# Patient Record
Sex: FEMALE | Race: WHITE | NOT HISPANIC OR LATINO
[De-identification: names, ages, dates, MRNs, and addresses within clinical notes are randomized per-mention and may not be internally consistent; named-entity substitution may affect disease eponyms.]

---

## 2019-08-01 ENCOUNTER — RESULT REVIEW (OUTPATIENT)
Age: 29
End: 2019-08-01

## 2020-08-11 ENCOUNTER — LABORATORY RESULT (OUTPATIENT)
Age: 30
End: 2020-08-11

## 2020-08-11 ENCOUNTER — APPOINTMENT (OUTPATIENT)
Dept: OBGYN | Facility: CLINIC | Age: 30
End: 2020-08-11
Payer: COMMERCIAL

## 2020-08-11 VITALS
HEIGHT: 63 IN | SYSTOLIC BLOOD PRESSURE: 104 MMHG | RESPIRATION RATE: 16 BRPM | BODY MASS INDEX: 21.26 KG/M2 | DIASTOLIC BLOOD PRESSURE: 64 MMHG | WEIGHT: 120 LBS

## 2020-08-11 DIAGNOSIS — Z32.02 ENCOUNTER FOR PREGNANCY TEST, RESULT NEGATIVE: ICD-10-CM

## 2020-08-11 DIAGNOSIS — R39.9 UNSPECIFIED SYMPTOMS AND SIGNS INVOLVING THE GENITOURINARY SYSTEM: ICD-10-CM

## 2020-08-11 LAB
BILIRUB UR QL STRIP: NORMAL
CLARITY UR: CLEAR
COLLECTION METHOD: NORMAL
GLUCOSE UR-MCNC: NORMAL
HCG UR QL: NEGATIVE
HGB UR QL STRIP.AUTO: NORMAL
KETONES UR-MCNC: NORMAL
LEUKOCYTE ESTERASE UR QL STRIP: NORMAL
NITRITE UR QL STRIP: NORMAL
PROT UR STRIP-MCNC: NORMAL
QUALITY CONTROL: YES

## 2020-08-11 PROCEDURE — 99202 OFFICE O/P NEW SF 15 MIN: CPT

## 2020-08-11 PROCEDURE — 81003 URINALYSIS AUTO W/O SCOPE: CPT | Mod: QW

## 2020-08-11 PROCEDURE — 81025 URINE PREGNANCY TEST: CPT

## 2020-08-11 RX ORDER — NITROFURANTOIN (MONOHYDRATE/MACROCRYSTALS) 25; 75 MG/1; MG/1
100 CAPSULE ORAL
Qty: 10 | Refills: 0 | Status: ACTIVE | COMMUNITY
Start: 2020-08-11 | End: 1900-01-01

## 2020-08-11 RX ORDER — FLUCONAZOLE 150 MG/1
150 TABLET ORAL
Qty: 1 | Refills: 0 | Status: ACTIVE | COMMUNITY
Start: 2020-08-11 | End: 1900-01-01

## 2020-08-11 NOTE — COUNSELING
[Nutrition] : nutrition [Exercise] : exercise [Vitamins/Supplements] : vitamins/supplements [Medication Management] : medication management [Bladder Hygiene] : bladder hygiene [Vulvar Hygiene] : vulvar hygiene

## 2020-08-12 LAB
APPEARANCE: CLEAR
BILIRUBIN URINE: NEGATIVE
BLOOD URINE: NEGATIVE
COLOR: NORMAL
GLUCOSE QUALITATIVE U: NEGATIVE
KETONES URINE: NEGATIVE
LEUKOCYTE ESTERASE URINE: NEGATIVE
NITRITE URINE: NEGATIVE
PH URINE: 6
PROTEIN URINE: NORMAL
SPECIFIC GRAVITY URINE: 1.03
UROBILINOGEN URINE: NORMAL

## 2020-12-28 ENCOUNTER — RESULT REVIEW (OUTPATIENT)
Age: 30
End: 2020-12-28

## 2022-01-07 ENCOUNTER — INPATIENT (INPATIENT)
Facility: HOSPITAL | Age: 32
LOS: 1 days | Discharge: HOME | End: 2022-01-09
Attending: OBSTETRICS & GYNECOLOGY | Admitting: OBSTETRICS & GYNECOLOGY
Payer: COMMERCIAL

## 2022-01-07 VITALS
RESPIRATION RATE: 16 BRPM | HEART RATE: 124 BPM | SYSTOLIC BLOOD PRESSURE: 104 MMHG | WEIGHT: 151.9 LBS | TEMPERATURE: 98 F | DIASTOLIC BLOOD PRESSURE: 78 MMHG | HEIGHT: 63 IN

## 2022-01-07 LAB
BASOPHILS # BLD AUTO: 0.07 K/UL — SIGNIFICANT CHANGE UP (ref 0–0.2)
BASOPHILS NFR BLD AUTO: 0.8 % — SIGNIFICANT CHANGE UP (ref 0–2)
BLD GP AB SCN SERPL QL: SIGNIFICANT CHANGE UP
COVID-19 SPIKE DOMAIN AB INTERP: POSITIVE
COVID-19 SPIKE DOMAIN ANTIBODY RESULT: >250 U/ML — HIGH
EOSINOPHIL # BLD AUTO: 0.12 K/UL — SIGNIFICANT CHANGE UP (ref 0–0.5)
EOSINOPHIL NFR BLD AUTO: 1.3 % — SIGNIFICANT CHANGE UP (ref 0–6)
GLUCOSE BLDC GLUCOMTR-MCNC: 83 MG/DL — SIGNIFICANT CHANGE UP (ref 70–99)
HCT VFR BLD CALC: 33.9 % — LOW (ref 34.5–45)
HGB BLD-MCNC: 11.4 G/DL — LOW (ref 11.5–15.5)
HIV 1 & 2 AB SERPL IA.RAPID: SIGNIFICANT CHANGE UP
IMM GRANULOCYTES NFR BLD AUTO: 0.3 % — SIGNIFICANT CHANGE UP (ref 0–1.5)
LYMPHOCYTES # BLD AUTO: 1.9 K/UL — SIGNIFICANT CHANGE UP (ref 1–3.3)
LYMPHOCYTES # BLD AUTO: 21.4 % — SIGNIFICANT CHANGE UP (ref 13–44)
MCHC RBC-ENTMCNC: 26.8 PG — LOW (ref 27–34)
MCHC RBC-ENTMCNC: 33.6 GM/DL — SIGNIFICANT CHANGE UP (ref 32–36)
MCV RBC AUTO: 79.8 FL — LOW (ref 80–100)
MONOCYTES # BLD AUTO: 0.56 K/UL — SIGNIFICANT CHANGE UP (ref 0–0.9)
MONOCYTES NFR BLD AUTO: 6.3 % — SIGNIFICANT CHANGE UP (ref 2–14)
NEUTROPHILS # BLD AUTO: 6.21 K/UL — SIGNIFICANT CHANGE UP (ref 1.8–7.4)
NEUTROPHILS NFR BLD AUTO: 69.9 % — SIGNIFICANT CHANGE UP (ref 43–77)
PLATELET # BLD AUTO: 218 K/UL — SIGNIFICANT CHANGE UP (ref 150–400)
RAPID RVP RESULT: SIGNIFICANT CHANGE UP
RBC # BLD: 4.25 M/UL — SIGNIFICANT CHANGE UP (ref 3.8–5.2)
RBC # FLD: 14.1 % — SIGNIFICANT CHANGE UP (ref 10.3–14.5)
SARS-COV-2 IGG+IGM SERPL QL IA: >250 U/ML — HIGH
SARS-COV-2 IGG+IGM SERPL QL IA: POSITIVE
SARS-COV-2 RNA SPEC QL NAA+PROBE: SIGNIFICANT CHANGE UP
WBC # BLD: 8.89 K/UL — SIGNIFICANT CHANGE UP (ref 3.8–10.5)
WBC # FLD AUTO: 8.89 K/UL — SIGNIFICANT CHANGE UP (ref 3.8–10.5)

## 2022-01-07 RX ORDER — OXYTOCIN 10 UNIT/ML
333.33 VIAL (ML) INJECTION
Qty: 20 | Refills: 0 | Status: DISCONTINUED | OUTPATIENT
Start: 2022-01-07 | End: 2022-01-09

## 2022-01-07 RX ORDER — CITRIC ACID/SODIUM CITRATE 300-500 MG
30 SOLUTION, ORAL ORAL ONCE
Refills: 0 | Status: DISCONTINUED | OUTPATIENT
Start: 2022-01-07 | End: 2022-01-08

## 2022-01-07 RX ORDER — SODIUM CHLORIDE 9 MG/ML
1000 INJECTION, SOLUTION INTRAVENOUS
Refills: 0 | Status: DISCONTINUED | OUTPATIENT
Start: 2022-01-07 | End: 2022-01-08

## 2022-01-07 NOTE — OB PROVIDER H&P - ASSESSMENT
A/P:    - Admit to L&D  - Admission labs  - Consent  - NPO  - IV fluid  - GBS unknown    d/w Dr. Robertson   A/P: 31y G1 at 40w5d GA by LMP who presents to L&D for IOL at term    - Admit to L&D  - Admission labs  - Consent  - NPO  - IV fluid  - GBS unknown    d/w Dr. Robertson

## 2022-01-07 NOTE — OB PROVIDER H&P - HISTORY OF PRESENT ILLNESS
31y G_P_ at _ weeks GA by LMP consistent with _ trimester sono who presents to L&D for _. Patient denies vaginal bleeding, contractions and leakage of fluid. She endorses good fetal movement. Denies fevers, chills, nausea, vomiting, chest pain, SOB, dizziness and headache. No other complaints at this time.   KATY: _  LMP: _  Prenatal course is significant for:  Prenatal course uncomplicated.      POB:  PGYN: -fibroids, -ovarian cysts, denies STD hx, denies abnormal PAPs   PMH: Denies  PSH: Denies  SH: Denies EtOH, tobacco and illicit drug use during this pregnancy; feels safe at home   Meds: PNVs  Allergies: NKDA    BMI:  Sono:  EFW:      31y G1 at 40w5d GA by LMP who presents to L&D for IOL at term. Patient reports contractions for 1 week worsening today. Denies Vaginal bleeding, leakage of fluid. She endorses good fetal movement. Denies fevers, chills, nausea, vomiting, chest pain, SOB, dizziness and headache. No other complaints at this time.   KATY: 1/2/22    Prenatal course is significant for:  Glucose intolerance     POB: G1  PGYN: -fibroids, -ovarian cysts, denies STD hx, denies abnormal PAPs   PMH: Denies  PSH: Denies  SH: Denies EtOH, tobacco and illicit drug use during this pregnancy; feels safe at home   Meds: PNVs  Allergies: NKDA

## 2022-01-07 NOTE — OB PROVIDER H&P - ATTENDING COMMENTS
31y G1 at 40w5d GA by LMP who presents to L&D for IOL at term.  VSS  plan for IOL with cytotec.     Gosia Robertson MD

## 2022-01-07 NOTE — OB RN PATIENT PROFILE - NS_PARA_OBGYN_ALL_OB_NU
Calcium level remains high despite sensipar 30mg daily  Increase to 60mg daily  Keep well hydrated  Repeat lab testing in 1 month  She is now open to considering surgery  Ordered 24-hour urine calcium testing and sestamibi scan  After review of results and completion of FNA of thyroid nodules will refer to surgeon for evaluation  Orders/instructions were reviewed with patient in detail  She was given collection supplies/instructions for 24-hour urine 
Control is stable     Lab Results   Component Value Date    HGBA1C 7 1 (H) 08/10/2021
Not at goal today, recently controlled at PCP office  Hyperparathyroidism may be contributing to the high blood pressure 
Previous FNA was non-diagnostic and experience was traumatic for patient  At last visit in April we discussed options with her son including monitoring with ultrasound or repeat FNA with afirma testing  At the visit, she and her son decided on repeat FNA with Afirma under sedation  She still reports that she wants the FNA as long as she does not have to be awake  She was given another order at the time of visit and she was advised to call to schedule procedure  I called her son (POA) to discuss her care and need for testing and recommend that he come to all medical visits if possible 
0

## 2022-01-07 NOTE — OB RN PATIENT PROFILE - NSICDXFAMILYHX_GEN_ALL_CORE_FT
FAMILY HISTORY:  Father  Still living? Yes, Estimated age: Age Unknown  FH: colon cancer, Age at diagnosis: Age Unknown

## 2022-01-07 NOTE — OB PROVIDER H&P - NSHPPHYSICALEXAM_GEN_ALL_CORE
T(C): 36.6 (01-07-22 @ 10:43), Max: 36.6 (01-07-22 @ 10:43)  HR: 124 (01-07-22 @ 10:43) (124 - 124)  BP: 104/78 (01-07-22 @ 10:43) (104/78 - 104/78)  RR: 16 (01-07-22 @ 10:43) (16 - 16)  SpO2: --    Gen: NAD, well-appearing, AAOx3   Abd: Soft, gravid  Ext: non-tender, non-edematous  SSE:   SVE:    Bedside sono:  FHT:  [place]: T(C): 36.6 (01-07-22 @ 10:43), Max: 36.6 (01-07-22 @ 10:43)  HR: 124 (01-07-22 @ 10:43) (124 - 124)  BP: 104/78 (01-07-22 @ 10:43) (104/78 - 104/78)  RR: 16 (01-07-22 @ 10:43) (16 - 16)  SpO2: --    Gen: NAD, well-appearing, AAOx3   Abd: Soft, gravid  Ext: non-tender, non-edematous  SVE:  1/50/-2  Bedside sono: vertex, posterior placenta  FHT: 130bpm, moderate variability, +acels, no decels  [place]: irregular contractions

## 2022-01-08 ENCOUNTER — TRANSCRIPTION ENCOUNTER (OUTPATIENT)
Age: 32
End: 2022-01-08

## 2022-01-08 LAB
HIV 1+2 AB+HIV1 P24 AG SERPL QL IA: SIGNIFICANT CHANGE UP
T PALLIDUM AB TITR SER: NEGATIVE — SIGNIFICANT CHANGE UP

## 2022-01-08 RX ORDER — OXYTOCIN 10 UNIT/ML
VIAL (ML) INJECTION
Qty: 30 | Refills: 0 | Status: DISCONTINUED | OUTPATIENT
Start: 2022-01-08 | End: 2022-01-08

## 2022-01-08 RX ORDER — SIMETHICONE 80 MG/1
80 TABLET, CHEWABLE ORAL EVERY 4 HOURS
Refills: 0 | Status: DISCONTINUED | OUTPATIENT
Start: 2022-01-08 | End: 2022-01-09

## 2022-01-08 RX ORDER — MAGNESIUM HYDROXIDE 400 MG/1
30 TABLET, CHEWABLE ORAL
Refills: 0 | Status: DISCONTINUED | OUTPATIENT
Start: 2022-01-08 | End: 2022-01-09

## 2022-01-08 RX ORDER — TETANUS TOXOID, REDUCED DIPHTHERIA TOXOID AND ACELLULAR PERTUSSIS VACCINE, ADSORBED 5; 2.5; 8; 8; 2.5 [IU]/.5ML; [IU]/.5ML; UG/.5ML; UG/.5ML; UG/.5ML
0.5 SUSPENSION INTRAMUSCULAR ONCE
Refills: 0 | Status: DISCONTINUED | OUTPATIENT
Start: 2022-01-08 | End: 2022-01-09

## 2022-01-08 RX ORDER — OXYCODONE HYDROCHLORIDE 5 MG/1
5 TABLET ORAL ONCE
Refills: 0 | Status: DISCONTINUED | OUTPATIENT
Start: 2022-01-08 | End: 2022-01-09

## 2022-01-08 RX ORDER — TETANUS TOXOID, REDUCED DIPHTHERIA TOXOID AND ACELLULAR PERTUSSIS VACCINE, ADSORBED 5; 2.5; 8; 8; 2.5 [IU]/.5ML; [IU]/.5ML; UG/.5ML; UG/.5ML; UG/.5ML
0.5 SUSPENSION INTRAMUSCULAR ONCE
Refills: 0 | Status: COMPLETED | OUTPATIENT
Start: 2022-01-08

## 2022-01-08 RX ORDER — OXYTOCIN 10 UNIT/ML
333.33 VIAL (ML) INJECTION
Qty: 20 | Refills: 0 | Status: DISCONTINUED | OUTPATIENT
Start: 2022-01-08 | End: 2022-01-09

## 2022-01-08 RX ORDER — HYDROCORTISONE 1 %
1 OINTMENT (GRAM) TOPICAL EVERY 6 HOURS
Refills: 0 | Status: DISCONTINUED | OUTPATIENT
Start: 2022-01-08 | End: 2022-01-09

## 2022-01-08 RX ORDER — DIPHENHYDRAMINE HCL 50 MG
25 CAPSULE ORAL EVERY 6 HOURS
Refills: 0 | Status: DISCONTINUED | OUTPATIENT
Start: 2022-01-08 | End: 2022-01-09

## 2022-01-08 RX ORDER — SODIUM CHLORIDE 9 MG/ML
3 INJECTION INTRAMUSCULAR; INTRAVENOUS; SUBCUTANEOUS EVERY 8 HOURS
Refills: 0 | Status: DISCONTINUED | OUTPATIENT
Start: 2022-01-08 | End: 2022-01-09

## 2022-01-08 RX ORDER — OXYCODONE HYDROCHLORIDE 5 MG/1
5 TABLET ORAL
Refills: 0 | Status: DISCONTINUED | OUTPATIENT
Start: 2022-01-08 | End: 2022-01-09

## 2022-01-08 RX ORDER — BENZOCAINE 10 %
1 GEL (GRAM) MUCOUS MEMBRANE EVERY 6 HOURS
Refills: 0 | Status: DISCONTINUED | OUTPATIENT
Start: 2022-01-08 | End: 2022-01-09

## 2022-01-08 RX ORDER — PRAMOXINE HYDROCHLORIDE 150 MG/15G
1 AEROSOL, FOAM RECTAL EVERY 4 HOURS
Refills: 0 | Status: DISCONTINUED | OUTPATIENT
Start: 2022-01-08 | End: 2022-01-09

## 2022-01-08 RX ORDER — KETOROLAC TROMETHAMINE 30 MG/ML
30 SYRINGE (ML) INJECTION ONCE
Refills: 0 | Status: DISCONTINUED | OUTPATIENT
Start: 2022-01-08 | End: 2022-01-09

## 2022-01-08 RX ORDER — IBUPROFEN 200 MG
600 TABLET ORAL EVERY 6 HOURS
Refills: 0 | Status: DISCONTINUED | OUTPATIENT
Start: 2022-01-08 | End: 2022-01-09

## 2022-01-08 RX ORDER — IBUPROFEN 200 MG
600 TABLET ORAL EVERY 6 HOURS
Refills: 0 | Status: COMPLETED | OUTPATIENT
Start: 2022-01-08 | End: 2022-12-07

## 2022-01-08 RX ORDER — AER TRAVELER 0.5 G/1
1 SOLUTION RECTAL; TOPICAL EVERY 4 HOURS
Refills: 0 | Status: DISCONTINUED | OUTPATIENT
Start: 2022-01-08 | End: 2022-01-09

## 2022-01-08 RX ORDER — LANOLIN
1 OINTMENT (GRAM) TOPICAL EVERY 6 HOURS
Refills: 0 | Status: DISCONTINUED | OUTPATIENT
Start: 2022-01-08 | End: 2022-01-09

## 2022-01-08 RX ORDER — ACETAMINOPHEN 500 MG
975 TABLET ORAL
Refills: 0 | Status: DISCONTINUED | OUTPATIENT
Start: 2022-01-08 | End: 2022-01-09

## 2022-01-08 RX ORDER — DIBUCAINE 1 %
1 OINTMENT (GRAM) RECTAL EVERY 6 HOURS
Refills: 0 | Status: DISCONTINUED | OUTPATIENT
Start: 2022-01-08 | End: 2022-01-09

## 2022-01-08 RX ADMIN — Medication 1000 MILLIUNIT(S)/MIN: at 10:58

## 2022-01-08 RX ADMIN — SODIUM CHLORIDE 3 MILLILITER(S): 9 INJECTION INTRAMUSCULAR; INTRAVENOUS; SUBCUTANEOUS at 12:54

## 2022-01-08 RX ADMIN — Medication 975 MILLIGRAM(S): at 15:11

## 2022-01-08 RX ADMIN — Medication 1 TABLET(S): at 12:30

## 2022-01-08 RX ADMIN — Medication 975 MILLIGRAM(S): at 22:01

## 2022-01-08 RX ADMIN — Medication 2 MILLIUNIT(S)/MIN: at 03:48

## 2022-01-08 RX ADMIN — Medication 975 MILLIGRAM(S): at 15:52

## 2022-01-08 NOTE — OB PROVIDER LABOR PROGRESS NOTE - NS_OBIHIFHRDETAILS_OBGYN_ALL_OB_FT
155 bpm, moderate variability, +accels, -deccels
150 bpm, moderate variability, +accels, intermittent shallow variable deccels
145 bpm, moderate variability, +accels, intermittent variable deccels (s/p SROM)
baseline 140, mod variability, +accels, no decels
reactive

## 2022-01-08 NOTE — OB PROVIDER LABOR PROGRESS NOTE - NS_SUBJECTIVE/OBJECTIVE_OBGYN_ALL_OB_FT
Patient endorsing rectal pressure.
Patient comfortable s/p Epidural.
Patient comfortable.
late entry note (written at 1551 for actions taken 1330)  patient vaginal cytotec 25 placed

## 2022-01-08 NOTE — OB PROVIDER DELIVERY SUMMARY - NSSELHIDDEN_OBGYN_ALL_OB_FT
[NS_DeliveryAttending1_OBGYN_ALL_OB_FT:[id number]==],[NS_DeliveryAssist1_OBGYN_ALL_OB_FT:[id number]=],[NS_DeliveryRN_OBGYN_ALL_OB_FT:[id number]=]

## 2022-01-08 NOTE — OB PROVIDER LABOR PROGRESS NOTE - ASSESSMENT
Cat 1 tracing   - VSS  - Vaginal cytotec x3 placed   - continue to monitor    Discussed with Dr. Anguiano 
Cat 2 tracing   - VSS  - Patient repositioned   - SROMed at 2am, clear fluids   - will order pitocin  - Epidural in place   - continue to monitor    D/w Dr. Anguiano
Cat I tracing  irregular ctx  reeval for 2nd vaginal cyottec vs other induction method at 1730
vaginal cytotec placed
Cat 2 tracing   - Patient repositioned  - Pitocin currently at 2   - patient to labor down    D/w Dr. Anguiano

## 2022-01-08 NOTE — OB PROVIDER LABOR PROGRESS NOTE - NS_EFFACEMANT_OBGYN_ALL_OB_NU
If you have any questions regarding your visit, Please contact your care team.  [facility]: [phone]  [facility] CLINIC HOURS TELEPHONE NUMBER   Cephas Agbeh, M.D. Lisa -       Thelma Pardo         Monday-[place]    8:00a.m-4:45 p.m    Tuesday--[place]     8:00a.m-4:45 p.m.    Thursday-[place]    8:00a.m-4:45 p.m.    Friday-[place]    8:00a.m-4:45 p.m    [facility]   [address] [place] [address]   [phone]-Ask for [facility]   Fax [phone]   Priapgq-[phone]     [facility] Labor and Delivery   [address] [place] [address] [place] [address]   [phone]    [facility]  [address] [facility] [address]  [phone]  [place]-[phone]   Urgent Care locations:    [facility] 
100
50
50
70
90

## 2022-01-08 NOTE — OB PROVIDER DELIVERY SUMMARY - NSPROVIDERDELIVERYNOTE_OBGYN_ALL_OB_FT
Patient felt rectal pressure and was found to be fully dilated, +1 station.  Ley catheter removed and patient positioned for pushing.  She pushed effectively for 30 minutes.  Patient prepped and draped for delivery.  Median episiotomy and in conjunction with maternal effort, she delivered a viable female infant.  Head delivered LORNE   No nuchal cord.   shoulders and body then delivered without difficulty.  cord clamped x2 and cut. Cord blood also collected for blood type.  Placenta delivered spontaneously and intact.  No gross pathology, 3 vessel cord. Excellent hemostasis was achieved.  Perineum and vagina were inspected and the median episiotomy / 1st degree perineal lac was noted and repaired with 2-0 vicryl suture. Excellent hemostasis obtained, EBL 105cc, no complications.    : female, Apgars 8/9. 3255g

## 2022-01-08 NOTE — OB RN DELIVERY SUMMARY - NS_SEPSISRSKCALC_OBGYN_ALL_OB_FT
EOS calculated successfully. EOS Risk Factor: 0.5/1000 live births ([facility] national incidence); GA=40w6d; Temp=98.78; ROM=6.3; GBS='Negative'; Antibiotics='No antibiotics or any antibiotics < 2 hrs prior to birth'

## 2022-01-08 NOTE — OB RN DELIVERY SUMMARY - NSSELHIDDEN_OBGYN_ALL_OB_FT
[NS_DeliveryAttending1_OBGYN_ALL_OB_FT:[id number]],[NS_DeliveryAssist1_OBGYN_ALL_OB_FT:[id number]],[NS_DeliveryRN_OBGYN_ALL_OB_FT:[id number]=]

## 2022-01-09 VITALS
RESPIRATION RATE: 16 BRPM | TEMPERATURE: 98 F | SYSTOLIC BLOOD PRESSURE: 122 MMHG | HEART RATE: 87 BPM | DIASTOLIC BLOOD PRESSURE: 78 MMHG

## 2022-01-09 LAB
HCT VFR BLD CALC: 26.2 % — LOW (ref 34.5–45)
HGB BLD-MCNC: 8.8 G/DL — LOW (ref 11.5–15.5)

## 2022-01-09 PROCEDURE — 86769 SARS-COV-2 COVID-19 ANTIBODY: CPT

## 2022-01-09 PROCEDURE — 86850 RBC ANTIBODY SCREEN: CPT

## 2022-01-09 PROCEDURE — 86900 BLOOD TYPING SEROLOGIC ABO: CPT

## 2022-01-09 PROCEDURE — 0225U NFCT DS DNA&RNA 21 SARSCOV2: CPT

## 2022-01-09 PROCEDURE — 85018 HEMOGLOBIN: CPT

## 2022-01-09 PROCEDURE — 85014 HEMATOCRIT: CPT

## 2022-01-09 PROCEDURE — 59050 FETAL MONITOR W/REPORT: CPT

## 2022-01-09 PROCEDURE — 59025 FETAL NON-STRESS TEST: CPT

## 2022-01-09 PROCEDURE — 82962 GLUCOSE BLOOD TEST: CPT

## 2022-01-09 PROCEDURE — 36415 COLL VENOUS BLD VENIPUNCTURE: CPT

## 2022-01-09 PROCEDURE — 87389 HIV-1 AG W/HIV-1&-2 AB AG IA: CPT

## 2022-01-09 PROCEDURE — 85025 COMPLETE CBC W/AUTO DIFF WBC: CPT

## 2022-01-09 PROCEDURE — G0463: CPT

## 2022-01-09 PROCEDURE — 86780 TREPONEMA PALLIDUM: CPT

## 2022-01-09 PROCEDURE — 86765 RUBEOLA ANTIBODY: CPT

## 2022-01-09 PROCEDURE — 86901 BLOOD TYPING SEROLOGIC RH(D): CPT

## 2022-01-09 PROCEDURE — 90707 MMR VACCINE SC: CPT

## 2022-01-09 PROCEDURE — 86703 HIV-1/HIV-2 1 RESULT ANTBDY: CPT

## 2022-01-09 RX ORDER — FERROUS SULFATE 325(65) MG
325 TABLET ORAL DAILY
Refills: 0 | Status: DISCONTINUED | OUTPATIENT
Start: 2022-01-09 | End: 2022-01-09

## 2022-01-09 RX ORDER — FERROUS SULFATE 325(65) MG
1 TABLET ORAL
Qty: 30 | Refills: 0
Start: 2022-01-09 | End: 2022-02-07

## 2022-01-09 RX ADMIN — Medication 600 MILLIGRAM(S): at 14:45

## 2022-01-09 RX ADMIN — Medication 1 TABLET(S): at 14:45

## 2022-01-09 RX ADMIN — Medication 0.5 MILLILITER(S): at 14:46

## 2022-01-09 RX ADMIN — Medication 975 MILLIGRAM(S): at 03:42

## 2022-01-09 RX ADMIN — SODIUM CHLORIDE 3 MILLILITER(S): 9 INJECTION INTRAMUSCULAR; INTRAVENOUS; SUBCUTANEOUS at 14:41

## 2022-01-09 RX ADMIN — Medication 975 MILLIGRAM(S): at 10:34

## 2022-01-09 RX ADMIN — Medication 600 MILLIGRAM(S): at 15:40

## 2022-01-09 RX ADMIN — Medication 975 MILLIGRAM(S): at 09:25

## 2022-01-09 NOTE — PROGRESS NOTE ADULT - ATTENDING COMMENTS
Patient was seen and evaluated at bedside. She has no complaints today. She is ambulating, tolerating regular diet, and voiding freely. Vitals signs stable. Labs reviewed and within normal limits. Continue routine postpartum care 15-Feb-2021 20:19

## 2022-01-09 NOTE — DISCHARGE NOTE OB - PLAN OF CARE
1) Please take Ibuprofen and tylenol as needed for pain control  2) Nothing in the vagina for 6 weeks (including no sex, no tampons, and no douching).  3) Please call your doctor for a follow up your postpartum appointment in 6 weeks.  4) Please continue taking vitamins postpartum.   5) Please call the office sooner if you have heavy vaginal bleeding, severe abdominal pain, or fever > 100.4F.  6) You may resume regular activity as tolerated

## 2022-01-09 NOTE — DISCHARGE NOTE OB - PATIENT PORTAL LINK FT
You can access the FollowMyHealth Patient Portal offered by [facility] by registering at the following website: [email or web address]. By joining [employer]’s FollowMyHealth portal, you will also be able to view your health information using other applications (apps) compatible with our system.

## 2022-01-09 NOTE — PROGRESS NOTE ADULT - SUBJECTIVE AND OBJECTIVE BOX
KETAN [place]  [id number]  Postpartum Note Vaginal Delivery  Patient is a 30yo G1now P1 s/p FT  day 1.    Subjective:  No acute events overnight.   Patient is tolerating diet and denies N/V.   Patient still has slight vaginal bleeding that is decreasing in amount.   She is breastfeeding and the baby is latching on.    Urinating appropriately.   -BM/+flatus.    Physical exam:  Vital Signs Last 24 Hrs  T(C): 36.5 (2022 05:12), Max: 37.2 (2022 08:40)  T(F): 97.7 (2022 05:12), Max: 99 (2022 08:40)  HR: 87 (2022 05:12) (83 - 133)  BP: 122/78 (2022 05:12) (116/73 - 126/62)  RR: 16 (2022 05:12) (15 - 18)  SpO2: 98% (2022 22:10) (97% - 98%)    Heart: RRR  Lungs: CTABL  Breast: non tender, not engorged   Abdomen: Soft, nontender, no distension, firm uterine fundus below umbilicus  Ext: No DVT signs, warm extremities    LABS:                        8.8    x     )-----------( x        ( 2022 06:39 )             26.2       acetaminophen     Tablet .. 975 milliGRAM(s) Oral <User Schedule>  benzocaine 20%/menthol 0.5% Spray 1 Spray(s) Topical every 6 hours PRN  dibucaine 1% Ointment 1 Application(s) Topical every 6 hours PRN  diphenhydrAMINE 25 milliGRAM(s) Oral every 6 hours PRN  diphtheria/tetanus/pertussis (acellular) Vaccine (ADAcel) 0.5 milliLiter(s) IntraMuscular once  hydrocortisone 1% Cream 1 Application(s) Topical every 6 hours PRN  ibuprofen  Tablet. 600 milliGRAM(s) Oral every 6 hours  ketorolac   Injectable 30 milliGRAM(s) IV Push once  lanolin Ointment 1 Application(s) Topical every 6 hours PRN  magnesium hydroxide Suspension 30 milliLiter(s) Oral two times a day PRN  oxyCODONE    IR 5 milliGRAM(s) Oral every 3 hours PRN  oxyCODONE    IR 5 milliGRAM(s) Oral once PRN  oxytocin Infusion 333.333 milliUNIT(s)/Min IV Continuous <Continuous>  oxytocin Infusion 333.333 milliUNIT(s)/Min IV Continuous <Continuous>  pramoxine 1%/zinc 5% Cream 1 Application(s) Topical every 4 hours PRN  prenatal multivitamin 1 Tablet(s) Oral daily  simethicone 80 milliGRAM(s) Chew every 4 hours PRN  sodium chloride 0.9% lock flush 3 milliLiter(s) IV Push every 8 hours  witch hazel Pads 1 Application(s) Topical every 4 hours PRN

## 2022-01-09 NOTE — DISCHARGE NOTE OB - CARE PLAN
1 Principal Discharge DX:	Spontaneous vaginal delivery  Assessment and plan of treatment:	1) Please take Ibuprofen and tylenol as needed for pain control  2) Nothing in the vagina for 6 weeks (including no sex, no tampons, and no douching).  3) Please call your doctor for a follow up your postpartum appointment in 6 weeks.  4) Please continue taking vitamins postpartum.   5) Please call the office sooner if you have heavy vaginal bleeding, severe abdominal pain, or fever > 100.4F.  6) You may resume regular activity as tolerated

## 2022-01-09 NOTE — DISCHARGE NOTE OB - NS MD DC FALL RISK RISK
For information on Fall & Injury Prevention, visit: [email or web address] OR  [email or web address] OR  https://www.cdc.gov/steadi/patient.html

## 2022-01-10 LAB
MEV IGG SER-ACNC: 8.3 AU/ML — SIGNIFICANT CHANGE UP
MEV IGG+IGM SER-IMP: NEGATIVE — SIGNIFICANT CHANGE UP

## 2022-01-13 ENCOUNTER — NON-APPOINTMENT (OUTPATIENT)
Age: 32
End: 2022-01-13

## 2022-01-14 ENCOUNTER — NON-APPOINTMENT (OUTPATIENT)
Age: 32
End: 2022-01-14

## 2022-01-18 NOTE — OB RN PATIENT PROFILE - BREASTFEEDING MOTHER TAUGHT HOW TO HAND EXPRESS THEIR OWN MILK
Health Maintenance Due   Topic Date Due   • DTaP/Tdap/Td Vaccine (1 - Tdap) Never done   • Shingles Vaccine (2 of 3) 05/05/2014   • COVID-19 Vaccine (3 - Moderna risk 4-dose series) 04/14/2021   • Medicare Wellness Visit  11/09/2021       Patient is due for topics as listed above but is not proceeding with Immunization(s) COVID-19, Dtap/Tdap/Td and Shingles and MWV (Medicare Wellness Visit) at this time. Patient doesn't want to proceed at this time.    Recent Review Flowsheet Data     Date 1/18/2022    Adult PHQ 2 Score 0    Adult PHQ 2 Interpretation No further screening needed    Little interest or pleasure in activity? 0    Feeling down, depressed or hopeless? 0           Statement Selected

## 2022-10-03 NOTE — DISCHARGE NOTE OB - MATERIALS PROVIDED
Patient was in good mood during my shift. Patient did not like pureed food but was agreeable to eat a little bit after encouragement and explanation of swallowing precautions. Patient consumed 100% of Magic cup High Protein Pudding. Patient preferred to stay in bed watching TV, walked to bathroom independently.   Vaccinations/[facility]  Screening Program/  Immunization Record/Breastfeeding Log/Guide to Postpartum Care/[facility] Hearing Screen Program/Back To Sleep Handout/Shaken Baby Prevention Handout/Breastfeeding Guide and Packet/Birth Certificate Instructions/Discharge Medication Information for Patients and Families Pocket Guide/MMR Vaccination (VIS Pub Date: 2012)/Tdap Vaccination (VIS Pub Date: 2012) Vaccinations/[facility] [place] Screening Program/[place]  Immunization Record/Breastfeeding Log/Breastfeeding Mother’s Support Group Information/Guide to Postpartum Care/[facility] Hearing Screen Program/Back To Sleep Handout/Shaken Baby Prevention Handout/Breastfeeding Guide and Packet/Birth Certificate Instructions/Discharge Medication Information for Patients and Families Pocket Guide/MMR Vaccination (VIS Pub Date: 2012)/Tdap Vaccination (VIS Pub Date: 2012)

## 2024-09-16 NOTE — OB RN PATIENT PROFILE - THE METHOD OF FEEDING WHEN THE NEWBORN REQUESTS AND CONTINUING EACH FEEDING SESSION UNTIL THE NEWBORN IS SATISFIED
----- Message from JUD Torre sent at 9/16/2024 10:29 AM EDT -----  Please let patient know that her urine culture is negative for infection.  No need for antibiotics.   Statement Selected

## 2025-02-12 ENCOUNTER — INPATIENT (INPATIENT)
Facility: HOSPITAL | Age: 35
LOS: 2 days | Discharge: HOME | End: 2025-02-15
Attending: OBSTETRICS & GYNECOLOGY | Admitting: OBSTETRICS & GYNECOLOGY
Payer: COMMERCIAL

## 2025-02-12 VITALS
HEIGHT: 63 IN | RESPIRATION RATE: 20 BRPM | SYSTOLIC BLOOD PRESSURE: 97 MMHG | HEART RATE: 80 BPM | WEIGHT: 139.99 LBS | TEMPERATURE: 98 F | DIASTOLIC BLOOD PRESSURE: 62 MMHG | OXYGEN SATURATION: 98 %

## 2025-02-12 DIAGNOSIS — O26.899 OTHER SPECIFIED PREGNANCY RELATED CONDITIONS, UNSPECIFIED TRIMESTER: ICD-10-CM

## 2025-02-12 LAB
ALBUMIN SERPL ELPH-MCNC: 2.5 G/DL — LOW (ref 3.3–5)
ALP SERPL-CCNC: 135 U/L — HIGH (ref 40–120)
ALT FLD-CCNC: 12 U/L — SIGNIFICANT CHANGE UP (ref 12–78)
ANION GAP SERPL CALC-SCNC: 8 MMOL/L — SIGNIFICANT CHANGE UP (ref 5–17)
AST SERPL-CCNC: 10 U/L — LOW (ref 15–37)
BASOPHILS # BLD AUTO: 0.04 K/UL — SIGNIFICANT CHANGE UP (ref 0–0.2)
BASOPHILS NFR BLD AUTO: 0.2 % — SIGNIFICANT CHANGE UP (ref 0–2)
BILIRUB SERPL-MCNC: 0.3 MG/DL — SIGNIFICANT CHANGE UP (ref 0.2–1.2)
BUN SERPL-MCNC: 15 MG/DL — SIGNIFICANT CHANGE UP (ref 7–23)
CALCIUM SERPL-MCNC: 9 MG/DL — SIGNIFICANT CHANGE UP (ref 8.5–10.1)
CHLORIDE SERPL-SCNC: 106 MMOL/L — SIGNIFICANT CHANGE UP (ref 96–108)
CO2 SERPL-SCNC: 20 MMOL/L — LOW (ref 22–31)
CREAT SERPL-MCNC: 0.59 MG/DL — SIGNIFICANT CHANGE UP (ref 0.5–1.3)
EGFR: 119 ML/MIN/1.73M2 — SIGNIFICANT CHANGE UP
EGFR: 119 ML/MIN/1.73M2 — SIGNIFICANT CHANGE UP
EOSINOPHIL # BLD AUTO: 0 K/UL — SIGNIFICANT CHANGE UP (ref 0–0.5)
EOSINOPHIL NFR BLD AUTO: 0 % — SIGNIFICANT CHANGE UP (ref 0–6)
GLUCOSE SERPL-MCNC: 90 MG/DL — SIGNIFICANT CHANGE UP (ref 70–99)
HCT VFR BLD CALC: 33.7 % — LOW (ref 34.5–45)
HGB BLD-MCNC: 11.5 G/DL — SIGNIFICANT CHANGE UP (ref 11.5–15.5)
IMM GRANULOCYTES # BLD AUTO: 0.07 K/UL — SIGNIFICANT CHANGE UP (ref 0–0.07)
IMM GRANULOCYTES NFR BLD AUTO: 0.4 % — SIGNIFICANT CHANGE UP (ref 0–0.9)
LYMPHOCYTES # BLD AUTO: 1.16 K/UL — SIGNIFICANT CHANGE UP (ref 1–3.3)
LYMPHOCYTES NFR BLD AUTO: 7.2 % — LOW (ref 13–44)
MCHC RBC-ENTMCNC: 27 PG — SIGNIFICANT CHANGE UP (ref 27–34)
MCHC RBC-ENTMCNC: 34.1 G/DL — SIGNIFICANT CHANGE UP (ref 32–36)
MCV RBC AUTO: 79.1 FL — LOW (ref 80–100)
MONOCYTES # BLD AUTO: 0.89 K/UL — SIGNIFICANT CHANGE UP (ref 0–0.9)
MONOCYTES NFR BLD AUTO: 5.5 % — SIGNIFICANT CHANGE UP (ref 2–14)
NEUTROPHILS # BLD AUTO: 13.91 K/UL — HIGH (ref 1.8–7.4)
NEUTROPHILS NFR BLD AUTO: 86.7 % — HIGH (ref 43–77)
NRBC # BLD AUTO: 0 K/UL — SIGNIFICANT CHANGE UP (ref 0–0)
NRBC # FLD: 0 K/UL — SIGNIFICANT CHANGE UP (ref 0–0)
NRBC BLD AUTO-RTO: 0 /100 WBCS — SIGNIFICANT CHANGE UP (ref 0–0)
PLATELET # BLD AUTO: 283 K/UL — SIGNIFICANT CHANGE UP (ref 150–400)
PMV BLD: 9.9 FL — SIGNIFICANT CHANGE UP (ref 7–13)
POTASSIUM SERPL-MCNC: 3.7 MMOL/L — SIGNIFICANT CHANGE UP (ref 3.5–5.3)
POTASSIUM SERPL-SCNC: 3.7 MMOL/L — SIGNIFICANT CHANGE UP (ref 3.5–5.3)
PROT SERPL-MCNC: 7.2 GM/DL — SIGNIFICANT CHANGE UP (ref 6–8.3)
RBC # BLD: 4.26 M/UL — SIGNIFICANT CHANGE UP (ref 3.8–5.2)
RBC # FLD: 15.8 % — HIGH (ref 10.3–14.5)
SODIUM SERPL-SCNC: 134 MMOL/L — LOW (ref 135–145)
T PALLIDUM AB TITR SER: NEGATIVE — SIGNIFICANT CHANGE UP
WBC # BLD: 16.07 K/UL — HIGH (ref 3.8–10.5)
WBC # FLD AUTO: 16.07 K/UL — HIGH (ref 3.8–10.5)

## 2025-02-12 PROCEDURE — 80053 COMPREHEN METABOLIC PANEL: CPT

## 2025-02-12 PROCEDURE — 85018 HEMOGLOBIN: CPT

## 2025-02-12 PROCEDURE — 86901 BLOOD TYPING SEROLOGIC RH(D): CPT

## 2025-02-12 PROCEDURE — 85025 COMPLETE CBC W/AUTO DIFF WBC: CPT

## 2025-02-12 PROCEDURE — 86850 RBC ANTIBODY SCREEN: CPT

## 2025-02-12 PROCEDURE — 86780 TREPONEMA PALLIDUM: CPT

## 2025-02-12 PROCEDURE — 85014 HEMATOCRIT: CPT

## 2025-02-12 PROCEDURE — 86900 BLOOD TYPING SEROLOGIC ABO: CPT

## 2025-02-12 PROCEDURE — 36415 COLL VENOUS BLD VENIPUNCTURE: CPT

## 2025-02-12 PROCEDURE — 12345F: CUSTOM | Mod: NC

## 2025-02-12 RX ORDER — PRAMOXINE HCL 1 %
1 GEL (GRAM) TOPICAL EVERY 4 HOURS
Refills: 0 | Status: DISCONTINUED | OUTPATIENT
Start: 2025-02-12 | End: 2025-02-15

## 2025-02-12 RX ORDER — DIBUCAINE 10 MG/G
1 OINTMENT TOPICAL EVERY 6 HOURS
Refills: 0 | Status: DISCONTINUED | OUTPATIENT
Start: 2025-02-12 | End: 2025-02-15

## 2025-02-12 RX ORDER — MAGNESIUM HYDROXIDE 400 MG/5ML
30 SUSPENSION ORAL
Refills: 0 | Status: DISCONTINUED | OUTPATIENT
Start: 2025-02-12 | End: 2025-02-15

## 2025-02-12 RX ORDER — OXYTOCIN-SODIUM CHLORIDE 0.9% IV SOLN 30 UNIT/500ML 30-0.9/5 UT/ML-%
167 SOLUTION INTRAVENOUS
Qty: 30 | Refills: 0 | Status: DISCONTINUED | OUTPATIENT
Start: 2025-02-12 | End: 2025-02-15

## 2025-02-12 RX ORDER — BENZOCAINE 220 MG/G
1 SPRAY, METERED PERIODONTAL EVERY 6 HOURS
Refills: 0 | Status: DISCONTINUED | OUTPATIENT
Start: 2025-02-12 | End: 2025-02-15

## 2025-02-12 RX ORDER — OXYCODONE HYDROCHLORIDE 30 MG/1
5 TABLET ORAL ONCE
Refills: 0 | Status: DISCONTINUED | OUTPATIENT
Start: 2025-02-12 | End: 2025-02-15

## 2025-02-12 RX ORDER — MODIFIED LANOLIN 100 %
1 CREAM (GRAM) TOPICAL EVERY 6 HOURS
Refills: 0 | Status: DISCONTINUED | OUTPATIENT
Start: 2025-02-12 | End: 2025-02-15

## 2025-02-12 RX ORDER — SIMETHICONE 80 MG
80 TABLET,CHEWABLE ORAL EVERY 4 HOURS
Refills: 0 | Status: DISCONTINUED | OUTPATIENT
Start: 2025-02-12 | End: 2025-02-15

## 2025-02-12 RX ORDER — KETOROLAC TROMETHAMINE 30 MG/ML
30 INJECTION, SOLUTION INTRAMUSCULAR; INTRAVENOUS ONCE
Refills: 0 | Status: DISCONTINUED | OUTPATIENT
Start: 2025-02-12 | End: 2025-02-12

## 2025-02-12 RX ORDER — PRENATAL 136/IRON/FOLIC ACID 27 MG-1 MG
1 TABLET ORAL DAILY
Refills: 0 | Status: DISCONTINUED | OUTPATIENT
Start: 2025-02-12 | End: 2025-02-15

## 2025-02-12 RX ORDER — DIPHENHYDRAMINE HCL 12.5MG/5ML
25 ELIXIR ORAL EVERY 6 HOURS
Refills: 0 | Status: DISCONTINUED | OUTPATIENT
Start: 2025-02-12 | End: 2025-02-15

## 2025-02-12 RX ORDER — HYDROCORTISONE 10 MG/G
1 CREAM TOPICAL EVERY 6 HOURS
Refills: 0 | Status: DISCONTINUED | OUTPATIENT
Start: 2025-02-12 | End: 2025-02-15

## 2025-02-12 RX ORDER — SODIUM CHLORIDE 9 G/1000ML
1000 INJECTION, SOLUTION INTRAVENOUS
Refills: 0 | Status: DISCONTINUED | OUTPATIENT
Start: 2025-02-12 | End: 2025-02-12

## 2025-02-12 RX ORDER — IBUPROFEN 200 MG
600 TABLET ORAL EVERY 6 HOURS
Refills: 0 | Status: DISCONTINUED | OUTPATIENT
Start: 2025-02-12 | End: 2025-02-15

## 2025-02-12 RX ORDER — WITCH HAZEL LEAF
1 FLUID EXTRACT MISCELLANEOUS EVERY 4 HOURS
Refills: 0 | Status: DISCONTINUED | OUTPATIENT
Start: 2025-02-12 | End: 2025-02-15

## 2025-02-12 RX ORDER — CITRIC ACID/SODIUM CITRATE 300-500 MG
30 SOLUTION, ORAL ORAL ONCE
Refills: 0 | Status: COMPLETED | OUTPATIENT
Start: 2025-02-12 | End: 2025-02-12

## 2025-02-12 RX ORDER — IBUPROFEN 200 MG
600 TABLET ORAL EVERY 6 HOURS
Refills: 0 | Status: COMPLETED | OUTPATIENT
Start: 2025-02-12 | End: 2026-01-11

## 2025-02-12 RX ORDER — ACETAMINOPHEN 500 MG/5ML
975 LIQUID (ML) ORAL
Refills: 0 | Status: DISCONTINUED | OUTPATIENT
Start: 2025-02-12 | End: 2025-02-15

## 2025-02-12 RX ORDER — OXYCODONE HYDROCHLORIDE 30 MG/1
5 TABLET ORAL
Refills: 0 | Status: DISCONTINUED | OUTPATIENT
Start: 2025-02-12 | End: 2025-02-15

## 2025-02-12 RX ADMIN — Medication 3 MILLILITER(S): at 22:00

## 2025-02-12 RX ADMIN — Medication 30 MILLILITER(S): at 12:13

## 2025-02-12 RX ADMIN — SODIUM CHLORIDE 125 MILLILITER(S): 9 INJECTION, SOLUTION INTRAVENOUS at 12:13

## 2025-02-12 RX ADMIN — Medication 975 MILLIGRAM(S): at 21:00

## 2025-02-12 RX ADMIN — Medication 1 APPLICATION(S): at 18:35

## 2025-02-12 RX ADMIN — SODIUM CHLORIDE 125 MILLILITER(S): 9 INJECTION, SOLUTION INTRAVENOUS at 12:14

## 2025-02-12 RX ADMIN — DIBUCAINE 1 APPLICATION(S): 10 OINTMENT TOPICAL at 18:35

## 2025-02-12 RX ADMIN — SODIUM CHLORIDE 125 MILLILITER(S): 9 INJECTION, SOLUTION INTRAVENOUS at 12:46

## 2025-02-12 RX ADMIN — Medication 975 MILLIGRAM(S): at 20:33

## 2025-02-12 RX ADMIN — KETOROLAC TROMETHAMINE 30 MILLIGRAM(S): 30 INJECTION, SOLUTION INTRAMUSCULAR; INTRAVENOUS at 17:49

## 2025-02-12 RX ADMIN — KETOROLAC TROMETHAMINE 30 MILLIGRAM(S): 30 INJECTION, SOLUTION INTRAMUSCULAR; INTRAVENOUS at 18:10

## 2025-02-12 RX ADMIN — BENZOCAINE 1 SPRAY(S): 220 SPRAY, METERED PERIODONTAL at 18:36

## 2025-02-13 ENCOUNTER — TRANSCRIPTION ENCOUNTER (OUTPATIENT)
Age: 35
End: 2025-02-13

## 2025-02-13 LAB
HCT VFR BLD CALC: 31.2 % — LOW (ref 34.5–45)
HGB BLD-MCNC: 10.4 G/DL — LOW (ref 11.5–15.5)

## 2025-02-13 RX ORDER — IBUPROFEN 200 MG
1 TABLET ORAL
Qty: 56 | Refills: 0
Start: 2025-02-13 | End: 2025-02-26

## 2025-02-13 RX ORDER — ACETAMINOPHEN 500 MG/5ML
3 LIQUID (ML) ORAL
Qty: 168 | Refills: 0
Start: 2025-02-13 | End: 2025-02-26

## 2025-02-13 RX ORDER — PRENATAL 136/IRON/FOLIC ACID 27 MG-1 MG
1 TABLET ORAL
Refills: 0 | DISCHARGE

## 2025-02-13 RX ORDER — CAFFEINE/SODIUM BENZOATE 250 MG/ML
500 VIAL (ML) INJECTION ONCE
Refills: 0 | Status: DISCONTINUED | OUTPATIENT
Start: 2025-02-13 | End: 2025-02-13

## 2025-02-13 RX ORDER — CAFFEINE 200 MG/1
600 TABLET ORAL ONCE
Refills: 0 | Status: COMPLETED | OUTPATIENT
Start: 2025-02-13 | End: 2025-02-13

## 2025-02-13 RX ADMIN — Medication 600 MILLIGRAM(S): at 23:34

## 2025-02-13 RX ADMIN — Medication 975 MILLIGRAM(S): at 09:45

## 2025-02-13 RX ADMIN — Medication 975 MILLIGRAM(S): at 09:19

## 2025-02-13 RX ADMIN — Medication 600 MILLIGRAM(S): at 12:45

## 2025-02-13 RX ADMIN — Medication 600 MILLIGRAM(S): at 06:08

## 2025-02-13 RX ADMIN — Medication 600 MILLIGRAM(S): at 18:27

## 2025-02-13 RX ADMIN — Medication 600 MILLIGRAM(S): at 00:07

## 2025-02-13 RX ADMIN — Medication 600 MILLIGRAM(S): at 06:45

## 2025-02-13 RX ADMIN — Medication 975 MILLIGRAM(S): at 22:30

## 2025-02-13 RX ADMIN — Medication 1 TABLET(S): at 09:19

## 2025-02-13 RX ADMIN — Medication 600 MILLIGRAM(S): at 00:30

## 2025-02-13 RX ADMIN — Medication 975 MILLIGRAM(S): at 21:57

## 2025-02-13 RX ADMIN — CAFFEINE 600 MILLIGRAM(S): 200 TABLET ORAL at 14:42

## 2025-02-13 RX ADMIN — Medication 600 MILLIGRAM(S): at 12:00

## 2025-02-13 RX ADMIN — Medication 3 MILLILITER(S): at 06:21

## 2025-02-13 RX ADMIN — Medication 975 MILLIGRAM(S): at 15:28

## 2025-02-14 RX ADMIN — Medication 1 TABLET(S): at 11:58

## 2025-02-14 RX ADMIN — Medication 600 MILLIGRAM(S): at 00:30

## 2025-02-14 RX ADMIN — Medication 600 MILLIGRAM(S): at 06:35

## 2025-02-14 RX ADMIN — Medication 600 MILLIGRAM(S): at 12:00

## 2025-02-14 RX ADMIN — Medication 975 MILLIGRAM(S): at 22:20

## 2025-02-14 RX ADMIN — OXYCODONE HYDROCHLORIDE 5 MILLIGRAM(S): 30 TABLET ORAL at 08:56

## 2025-02-14 RX ADMIN — Medication 600 MILLIGRAM(S): at 18:01

## 2025-02-14 RX ADMIN — Medication 975 MILLIGRAM(S): at 14:37

## 2025-02-14 RX ADMIN — Medication 975 MILLIGRAM(S): at 09:50

## 2025-02-14 RX ADMIN — Medication 975 MILLIGRAM(S): at 03:40

## 2025-02-14 RX ADMIN — Medication 975 MILLIGRAM(S): at 08:49

## 2025-02-14 RX ADMIN — Medication 600 MILLIGRAM(S): at 07:34

## 2025-02-14 RX ADMIN — Medication 975 MILLIGRAM(S): at 21:21

## 2025-02-14 RX ADMIN — Medication 975 MILLIGRAM(S): at 02:43

## 2025-02-14 RX ADMIN — Medication 600 MILLIGRAM(S): at 17:43

## 2025-02-14 RX ADMIN — Medication 975 MILLIGRAM(S): at 15:00

## 2025-02-14 RX ADMIN — OXYCODONE HYDROCHLORIDE 5 MILLIGRAM(S): 30 TABLET ORAL at 09:50

## 2025-02-14 RX ADMIN — Medication 600 MILLIGRAM(S): at 11:58

## 2025-02-15 VITALS
HEART RATE: 89 BPM | RESPIRATION RATE: 16 BRPM | OXYGEN SATURATION: 98 % | TEMPERATURE: 98 F | SYSTOLIC BLOOD PRESSURE: 111 MMHG | DIASTOLIC BLOOD PRESSURE: 70 MMHG

## 2025-02-15 RX ADMIN — Medication 600 MILLIGRAM(S): at 01:20

## 2025-02-15 RX ADMIN — Medication 1 TABLET(S): at 09:18

## 2025-02-15 RX ADMIN — Medication 975 MILLIGRAM(S): at 09:18

## 2025-02-15 RX ADMIN — Medication 600 MILLIGRAM(S): at 07:14

## 2025-02-15 RX ADMIN — Medication 600 MILLIGRAM(S): at 00:25

## 2025-02-15 RX ADMIN — Medication 600 MILLIGRAM(S): at 06:43

## 2025-02-15 RX ADMIN — Medication 600 MILLIGRAM(S): at 12:04

## 2025-02-25 DIAGNOSIS — Z3A.38 38 WEEKS GESTATION OF PREGNANCY: ICD-10-CM
